# Patient Record
Sex: MALE | Race: WHITE | NOT HISPANIC OR LATINO
[De-identification: names, ages, dates, MRNs, and addresses within clinical notes are randomized per-mention and may not be internally consistent; named-entity substitution may affect disease eponyms.]

---

## 2023-04-12 ENCOUNTER — APPOINTMENT (OUTPATIENT)
Dept: ORTHOPEDIC SURGERY | Facility: CLINIC | Age: 28
End: 2023-04-12
Payer: COMMERCIAL

## 2023-04-12 ENCOUNTER — NON-APPOINTMENT (OUTPATIENT)
Age: 28
End: 2023-04-12

## 2023-04-12 VITALS — HEIGHT: 70 IN | BODY MASS INDEX: 27.2 KG/M2 | WEIGHT: 190 LBS

## 2023-04-12 DIAGNOSIS — S83.91XA SPRAIN OF UNSPECIFIED SITE OF RIGHT KNEE, INITIAL ENCOUNTER: ICD-10-CM

## 2023-04-12 PROBLEM — Z00.00 ENCOUNTER FOR PREVENTIVE HEALTH EXAMINATION: Status: ACTIVE | Noted: 2023-04-12

## 2023-04-12 PROCEDURE — 99204 OFFICE O/P NEW MOD 45 MIN: CPT

## 2023-04-12 NOTE — DISCUSSION/SUMMARY
[de-identified] : I had a lengthy discussion with the patient regarding their current condition. We discussed the treatment options including operative and nonoperative management. At this time I recommended a stat MRI of his right knee to rule out a bucket-handle lateral meniscus tear.  He can continue with the crutches partial weightbearing.  He can ice and elevate the extremity.  He will follow-up with Dr. Catalan after completion of his MRI.  All questions were answered.  He works as a  and works from home.

## 2023-04-12 NOTE — HISTORY OF PRESENT ILLNESS
[Stable] : stable [___ days] : [unfilled] day(s) ago [4] : an average pain level of 4/10 [3] : a minimum pain level of 3/10 [5] : a maximum pain level of 5/10 [Walking] : worsened by walking [Knee Extension] : worsened with knee extension [de-identified] : SHAHRIAR REES is a 27 year male being seen for initial visit R knee pain. He reports prior h/x of R knee lateral meniscus repair from a bucket-handle meniscus tear in 2015 by a  in [place]. He presents today WBAT with use of crutches. He reports yesterday he was sitting with his legs crossed, when he moved wrong and felt a pop in his R knee. He reports since injury he has been unable to fully extend his R knee. He reports his knee swelled up on him. He is unable to fully WB on the right side. He endorses pain over the LJL. Patient denies numbness and tingling to the extremities.  Went to [facility] where he had x-rays and was given crutches.  He is here today for orthopedic consultation.  He states he feels something is stuck on the outside part of his knee.  He has been taking over-the-counter analgesics.

## 2023-04-12 NOTE — REVIEW OF SYSTEMS
[Joint Pain] : joint pain [Joint Swelling] : joint swelling [Negative] : Heme/Lymph [FreeTextEntry9] : R knee

## 2023-04-12 NOTE — PHYSICAL EXAM
[de-identified] :  Multi-System Physical Exam\par \par General: Well appearing, no acute distress \par Neurologic: A&Ox3, No focal deficits \par Head: NCAT without abrasions, lacerations, or ecchymosis to head, face, or scalp \par Eyes: No scleral icterus, no gross abnormalities \par Respiratory: Equal chest wall expansion bilaterally, no accessory muscle use \par Lymphatic: No lymphadenopathy palpated \par Skin: Warm and dry \par Psychiatric: Normal mood and affect\par \par The right knee is examined and reveals no swelling or effusion.  There is exquisite tenderness in the lateral joint line.  No medial sided tenderness.  He has range of motion from 1 degree through 125 degrees with pain.  He has a couple degrees of recurvatum on the contralateral side which is absent on the right.  He is stable to valgus varus stress as well as Lachman testing.  He has pain with Paola and grind testing.  He is stable to anterior and posterior drawer.  He can straight leg raise against resistance.  No pain with patella mobility or apprehension.  His calf and thigh are soft and nontender.  He is grossly neurovascular intact distally. [de-identified] : 4 views of R knee were performed at [facility] yesterday and the report is available for me to review in office.  They showed no fracture or acute bony injury.

## 2023-04-13 ENCOUNTER — APPOINTMENT (OUTPATIENT)
Dept: ORTHOPEDIC SURGERY | Facility: CLINIC | Age: 28
End: 2023-04-13
Payer: COMMERCIAL

## 2023-04-13 VITALS — HEIGHT: 70 IN | BODY MASS INDEX: 27.2 KG/M2 | WEIGHT: 190 LBS

## 2023-04-13 PROCEDURE — 99214 OFFICE O/P EST MOD 30 MIN: CPT

## 2023-04-13 NOTE — DISCUSSION/SUMMARY
[de-identified] : We had a thorough discussion regarding the nature of his pain, the pathophysiology, as well as all treatment options. Based on clinical exam, MRI and radiograph findings he has a bucket handle tear of the lateral meniscus of the right knee. I discussed operative and non-operative treatment modalities. All risks, benefits and alternatives to the proposed surgical procedure, right knee arthroscopy, partial meniscectomy vs meniscus repair, were discussed in great detail with the patient. Risks include but are not limited to pain, bleeding, infection, stiffness, medical complications (including DVT, PE, MI), and risks of anesthesia. The patient expressed understanding and all questions were answered. The patient is electing to proceed, and will have the patient scheduled accordingly. I provided him contact number of my surgical coordinator Tee, who will go over dates for this procedure. All questions were answered.

## 2023-04-13 NOTE — ADDENDUM
[FreeTextEntry1] : Documented by Che Vigil acting as a scribe for Dr. Catalan and Brandon Duggan PA-C on 04/13/2023.   All medical record entries made by the Scribe were at my, Dr. Catalan, and Brandon Duggan's, direction and personally dictated by me on 04/13/2023. I have reviewed the chart and agree that the record accurately reflects my personal performance of the history, physical exam, procedure and imaging.

## 2023-04-13 NOTE — HISTORY OF PRESENT ILLNESS
[de-identified] : SHAHRIAR REES is a 27 year male being seen for f/u visit R knee pain. He is here today for MRI review. He presents NWB ambulating with crutches. Patient is moving to [place] in approximately 1 month for 1 year.

## 2023-04-13 NOTE — PHYSICAL EXAM
[de-identified] :  Multi-System Physical Exam\par \par General: Well appearing, no acute distress \par Neurologic: A&Ox3, No focal deficits \par Head: NCAT without abrasions, lacerations, or ecchymosis to head, face, or scalp \par Eyes: No scleral icterus, no gross abnormalities \par Respiratory: Equal chest wall expansion bilaterally, no accessory muscle use \par Lymphatic: No lymphadenopathy palpated \par Skin: Warm and dry \par Psychiatric: Normal mood and affect\par \par The right knee is examined and reveals no swelling or effusion.  There is exquisite tenderness in the lateral joint line.  No medial sided tenderness.  He has range of motion from 1 degree through 125 degrees with pain.  He has a couple degrees of recurvatum on the contralateral side which is absent on the right.  He is stable to valgus varus stress as well as Lachman testing.  He has pain with Paola and grind testing.  He is stable to anterior and posterior drawer.  He can straight leg raise against resistance.  No pain with patella mobility or apprehension.  His calf and thigh are soft and nontender.  He is grossly neurovascular intact distally. [de-identified] : Procedure: MRI of  the RIGHT KNEE\par Dated:   4/12/2023\par Impression: \par Bucket handle tear lateral meniscus as noted. Deep cartilage fissures at central and lateral tibia with no fracture. \par Joint effusion.

## 2023-04-21 ENCOUNTER — APPOINTMENT (OUTPATIENT)
Dept: ORTHOPEDIC SURGERY | Facility: AMBULATORY SURGERY CENTER | Age: 28
End: 2023-04-21
Payer: COMMERCIAL

## 2023-04-21 PROCEDURE — 29881 ARTHRS KNE SRG MNISECTMY M/L: CPT | Mod: RT

## 2023-04-21 RX ORDER — OXYCODONE 5 MG/1
5 TABLET ORAL
Qty: 20 | Refills: 0 | Status: ACTIVE | COMMUNITY
Start: 2023-04-21 | End: 1900-01-01

## 2023-04-21 RX ORDER — ASPIRIN ENTERIC COATED TABLETS 81 MG 81 MG/1
81 TABLET, DELAYED RELEASE ORAL DAILY
Qty: 14 | Refills: 0 | Status: ACTIVE | COMMUNITY
Start: 2023-04-21 | End: 1900-01-01

## 2023-04-21 RX ORDER — ONDANSETRON 4 MG/1
4 TABLET ORAL
Qty: 42 | Refills: 0 | Status: COMPLETED | COMMUNITY
Start: 2023-04-21 | End: 2023-04-28

## 2023-05-01 ENCOUNTER — APPOINTMENT (OUTPATIENT)
Dept: ORTHOPEDIC SURGERY | Facility: CLINIC | Age: 28
End: 2023-05-01
Payer: COMMERCIAL

## 2023-05-01 DIAGNOSIS — S83.251A BUCKET-HANDLE TEAR OF LATERAL MENISCUS, CURRENT INJURY, RIGHT KNEE, INITIAL ENCOUNTER: ICD-10-CM

## 2023-05-01 PROCEDURE — 99024 POSTOP FOLLOW-UP VISIT: CPT

## 2023-05-01 NOTE — ADDENDUM
[FreeTextEntry1] : Documented by Che Vigil acting as a scribe for Dr. Catalan and Brandon Duggan PA-C on 05/01/2023.   All medical record entries made by the Scribe were at my, Dr. Catalan, and Brandon Duggan's, direction and personally dictated by me on 05/01/2023. I have reviewed the chart and agree that the record accurately reflects my personal performance of the history, physical exam, procedure and imaging.

## 2023-05-01 NOTE — HISTORY OF PRESENT ILLNESS
[Clean/Dry/Intact] : clean, dry and intact [Neuro Intact] : an unremarkable neurological exam [Vascular Intact] : ~T peripheral vascular exam normal [Doing Well] : is doing well [Excellent Pain Control] : has excellent pain control [No Sign of Infection] : is showing no signs of infection [de-identified] : spo right knee arthroscopy, partial meniscectomy. DOS: 4/21/2023. [de-identified] : SHAHRIAR is a 27 year male here today for spo right knee arthroscopy, partial meniscectomy. DOS: 4/21/2023. He denies fever or chills, redness around or near the incision site(s), numbness/tingling. He denies nausea/ vomiting and admits to their appetite since their surgery being back to normal. Normal bowel habits at this time. Patient has not started physical therapy.  Patient since their surgery has utilized tylenol as their primary pain control with relief in their symptoms. They no longer require narcotics for pain control.  [de-identified] : Right (R) Knee  \par \par there is moderate effusion without warmth and mild ecchymosis throughout the leg. \par Knee motion is 0 - 130 degrees of passive ROM, straight leg raise without extension lag and pain free. \par Good quad strength. Full sensation intact and dorsalis pedis pulses 2+.  \par \par Special Tests: NEG Lachman, NEG anterior drawer, NEG posterior drawer with collateral testing and varus/valgus normal.  NEG Homans, no calf tenderness, soft and compressible   [de-identified] : No new imaging performed today.  [de-identified] : I had a discussion with the patient regarding the operative and postoperative course. Patient is doing well. He should begin physical therapy. Patient will follow up in 4 wks for repeat clinical assessment. All questions were answered and the patient verbalized understanding. The patient is in agreement with this treatment plan.

## 2023-05-30 ENCOUNTER — APPOINTMENT (OUTPATIENT)
Dept: ORTHOPEDIC SURGERY | Facility: CLINIC | Age: 28
End: 2023-05-30

## 2023-05-31 ENCOUNTER — APPOINTMENT (OUTPATIENT)
Dept: ORTHOPEDIC SURGERY | Facility: CLINIC | Age: 28
End: 2023-05-31

## 2023-07-26 ENCOUNTER — APPOINTMENT (OUTPATIENT)
Dept: ORTHOPEDIC SURGERY | Facility: CLINIC | Age: 28
End: 2023-07-26